# Patient Record
Sex: FEMALE | URBAN - METROPOLITAN AREA
[De-identification: names, ages, dates, MRNs, and addresses within clinical notes are randomized per-mention and may not be internally consistent; named-entity substitution may affect disease eponyms.]

---

## 2020-12-31 ENCOUNTER — TELEPHONE (OUTPATIENT)
Dept: SCHEDULING | Facility: IMAGING CENTER | Age: 25
End: 2020-12-31

## 2021-01-06 ENCOUNTER — APPOINTMENT (OUTPATIENT)
Dept: MEDICAL GROUP | Facility: IMAGING CENTER | Age: 26
End: 2021-01-06

## 2021-01-15 ENCOUNTER — TELEMEDICINE (OUTPATIENT)
Dept: MEDICAL GROUP | Facility: IMAGING CENTER | Age: 26
End: 2021-01-15

## 2021-01-15 VITALS — WEIGHT: 170 LBS | HEIGHT: 63 IN | HEART RATE: 70 BPM | BODY MASS INDEX: 30.12 KG/M2

## 2021-01-15 DIAGNOSIS — F33.1 MODERATE EPISODE OF RECURRENT MAJOR DEPRESSIVE DISORDER (HCC): ICD-10-CM

## 2021-01-15 DIAGNOSIS — F41.1 GAD (GENERALIZED ANXIETY DISORDER): ICD-10-CM

## 2021-01-15 PROCEDURE — 99214 OFFICE O/P EST MOD 30 MIN: CPT | Performed by: FAMILY MEDICINE

## 2021-01-15 RX ORDER — CHLORAL HYDRATE 500 MG
CAPSULE ORAL
COMMUNITY

## 2021-01-15 RX ORDER — DULOXETIN HYDROCHLORIDE 20 MG/1
20 CAPSULE, DELAYED RELEASE ORAL DAILY
Qty: 90 CAP | Refills: 0 | Status: SHIPPED | OUTPATIENT
Start: 2021-01-15 | End: 2021-01-15

## 2021-01-15 RX ORDER — DULOXETIN HYDROCHLORIDE 20 MG/1
20 CAPSULE, DELAYED RELEASE ORAL DAILY
Qty: 30 CAP | Refills: 0 | Status: SHIPPED | OUTPATIENT
Start: 2021-01-15 | End: 2021-04-15

## 2021-01-15 RX ORDER — MELATONIN 3 MG
LOZENGE ORAL
COMMUNITY

## 2021-01-15 ASSESSMENT — PATIENT HEALTH QUESTIONNAIRE - PHQ9
CLINICAL INTERPRETATION OF PHQ2 SCORE: 4
5. POOR APPETITE OR OVEREATING: 3 - NEARLY EVERY DAY
SUM OF ALL RESPONSES TO PHQ QUESTIONS 1-9: 22

## 2021-01-15 ASSESSMENT — ANXIETY QUESTIONNAIRES
5. BEING SO RESTLESS THAT IT IS HARD TO SIT STILL: NEARLY EVERY DAY
2. NOT BEING ABLE TO STOP OR CONTROL WORRYING: NEARLY EVERY DAY
1. FEELING NERVOUS, ANXIOUS, OR ON EDGE: NEARLY EVERY DAY
3. WORRYING TOO MUCH ABOUT DIFFERENT THINGS: NEARLY EVERY DAY
4. TROUBLE RELAXING: NEARLY EVERY DAY
7. FEELING AFRAID AS IF SOMETHING AWFUL MIGHT HAPPEN: MORE THAN HALF THE DAYS
6. BECOMING EASILY ANNOYED OR IRRITABLE: MORE THAN HALF THE DAYS
GAD7 TOTAL SCORE: 19

## 2021-01-15 ASSESSMENT — PAIN SCALES - GENERAL: PAINLEVEL: NO PAIN

## 2021-01-15 NOTE — PROGRESS NOTES
Telemedicine Visit: New Patient     This encounter was conducted via Zoom.   Verbal consent was obtained. Patient's identity was verified. Patient aware this will be   billed the same as an in person evaluation.  Patient in home, I am in office at [address] [facility]  Subjective:     Chief Complaint   Patient presents with   • Establish Care   • Depression   • Anxiety       Sis Burk is a 25 y.o. female with history of MDD, JESUS on virtual visit to discuss establishing care and depression.  Family has noticed some mood changes. With thoughts of self harm. phq and jesus high. Jesus higher. Has been on meds prior. After graduation may 2019 her symptoms became worse.   Insomnia none. Takes melatonin every now and then. At times she takes melatonon for not being able to stay alsleep which works well for her.   Bipolar features: no elizabeth waves. She does not have intercourse currently. Moved to [place] August 2020. She does not drink or smoke.   Therapy she is not in therapy.   She reports that if she slows down at all she will get pretty depressed. She does get anxious around people. Anxious about her job.   Has back pain but would not describe herself has having chronic pain.   She does not have any plans to hurt herself. Over the holidays every day she did have thoughts of hurting herself. She admits to engaging in self harm with bruising herself, cutting herself and took melatonin 25mg once to try to 'check out.' She feels these behaviors are more of a release of emotion rather than trying to kill herself. She does feel safe and does not have a plan or intention to hurt herself at this time.    ROS    See HPI  Constitutional: Negative for fever, chills and malaise/fatigue.   HENT: Negative for congestion, itchy watery eyes  Eyes: Negative for pain or sudden changes in vision  Respiratory: Negative for cough and shortness of breath.    Cardiovascular: Negative for leg swelling or chest pain  Gastrointestinal:  "Negative for nausea, vomiting, abdominal pain and diarrhea.   Genitourinary: Negative for dysuria and hematuria.   Skin: Negative for rash or concerning moles   Neurological: Negative for dizziness, focal weakness   Psychiatric/Behavioral: With anxiety and depression  Musculoskeletal: no weakness or joint stiffness    No Known Allergies    Current medicines (including changes today)  Current Outpatient Medications   Medication Sig Dispense Refill   • Omega-3 Fatty Acids (FISH OIL) 1000 MG Cap capsule Take  by mouth 3 times a day, with meals.     • Cholecalciferol (VITAMIN D3 PO) Take  by mouth.     • Melatonin 1 MG/4ML Liquid Take  by mouth.     • DULoxetine (CYMBALTA) 20 MG Cap DR Particles Take 1 Cap by mouth every day for 90 days. 30 Cap 0     No current facility-administered medications for this visit.        She  has no past medical history on file.  She  has no past surgical history on file.      History reviewed. No pertinent family history.  No family status information on file.       There are no active problems to display for this patient.         Objective:   Vitals obtained by patient:  Pulse 70 Comment: smart watch  Ht 1.6 m (5' 3\")   Wt 77.1 kg (170 lb)   LMP 11/19/2020 (Exact Date)   BMI 30.11 kg/m²     Physical Exam:  Constitutional: Alert, no distress, well-groomed.  Skin: No rashes in visible areas.  Eye: Round. Conjunctiva clear, lids normal. No icterus.   ENMT: Lips pink without lesions, good dentition, moist mucous membranes. Phonation normal.  Neck: No masses, no thyromegaly. Moves freely without pain.  CV: Pulse as reported by patient  Respiratory: Unlabored respiratory effort, no cough or audible wheeze  Psych: Alert and oriented x3, normal affect and mood.   Neuro: symmetric face. Alert and oriented. Follows commands. No aphasia or dysarthria.    Labs:  No results found for any previous visit.       Imaging:   No results found.    Assessment and Plan:   The following treatment plan was " discussed:   -Suicide hotline information provided  -Discussed abnormal behavior or increased suicidal thoughts or violent or unusual behaviors that can occur with this medication.  Patient to discuss this with her closest family members and stop medication immediately if this occurs.  -Patient has a hard time with taking medication and also seeing a therapist.  She feels like therapy is for a certain type of person and she does not know to be that person.  Into the visit she reports that she would like to get tested for ADHD.  And that is how I am getting her to go to psychology.  Problem List Items Addressed This Visit     None      Visit Diagnoses     Moderate episode of recurrent major depressive disorder (HCC)        Relevant Medications    DULoxetine (CYMBALTA) 20 MG Cap DR Particles    Other Relevant Orders    REFERRAL TO PSYCHOLOGY    QUETA (generalized anxiety disorder)        Relevant Medications    DULoxetine (CYMBALTA) 20 MG Cap DR Particles    Other Relevant Orders    REFERRAL TO PSYCHOLOGY          Follow-up: Return in about 1 month (around 2/15/2021).        Portions of this note may be dictated using Dragon NaturallySpeaking voice recognition software.  Variances in spelling and vocabulary are possible and unintentional.  Not all areas may be caught/corrected.  Please notify me if any discrepancies are noted or if the meaning of any statement is not correct/clear.